# Patient Record
Sex: FEMALE | Race: WHITE | NOT HISPANIC OR LATINO | Employment: FULL TIME | URBAN - METROPOLITAN AREA
[De-identification: names, ages, dates, MRNs, and addresses within clinical notes are randomized per-mention and may not be internally consistent; named-entity substitution may affect disease eponyms.]

---

## 2017-01-24 ENCOUNTER — TELEPHONE (OUTPATIENT)
Dept: CARDIOLOGY | Facility: CLINIC | Age: 32
End: 2017-01-24

## 2017-01-24 NOTE — TELEPHONE ENCOUNTER
I would defer to the primary MD or psychiatrist. There is not any particular depression medication that is worse for heart disease that I know of.    K

## 2017-01-24 NOTE — TELEPHONE ENCOUNTER
----- Message from Gertrude Pizarro sent at 1/24/2017 10:05 AM EST -----  Regarding: Non-Urgent Medical Question  Contact: [phone]  I  am hoping you can recommend a anxiety/depression medication that is OK for the heart (fewest heart side effects)    Thank you,   Gertrude pizarro

## 2017-01-30 ENCOUNTER — TELEPHONE (OUTPATIENT)
Dept: CARDIOLOGY | Facility: CLINIC | Age: 32
End: 2017-01-30

## 2017-01-30 NOTE — TELEPHONE ENCOUNTER
----- Message from Gertrude Nayak sent at 1/29/2017 11:06 PM EST -----  Regarding: Non-Urgent Medical Question  Contact: [phone]  Today my heart rate has been 44 to 57.  Normally it runs 60 to 70.  As you know my bp has been running extremely low now today my hr was.  I'm not sure what's going on.   Do I need to have an echo ran to check on everything?  How low is to low?  As I was sitting and definitely not relaxed.  I did feel some sob and faint feeling.

## 2019-05-29 PROBLEM — K31.84 GASTROPARESIS: Status: ACTIVE | Noted: 2019-05-29

## 2019-05-29 PROBLEM — E16.2 HYPOGLYCEMIA: Status: ACTIVE | Noted: 2019-05-29

## 2019-05-29 PROBLEM — G90.A POTS (POSTURAL ORTHOSTATIC TACHYCARDIA SYNDROME): Status: ACTIVE | Noted: 2019-05-29

## 2019-05-29 PROBLEM — G62.9 NEUROPATHY: Status: ACTIVE | Noted: 2019-05-29

## 2019-05-29 PROBLEM — I73.00 RAYNAUD DISEASE: Status: ACTIVE | Noted: 2019-05-29

## 2019-05-29 PROBLEM — J45.909 ASTHMA: Status: ACTIVE | Noted: 2019-05-29

## 2019-06-03 ENCOUNTER — TELEPHONE (OUTPATIENT)
Dept: CARDIOLOGY | Facility: CLINIC | Age: 34
End: 2019-06-03

## 2019-06-03 NOTE — TELEPHONE ENCOUNTER
Pt has an appointment for august 1, 2019 @ 10:45, she was referred by dr mckeon @ the [facility], the patients wants to be worked in sooner if possible, [phone]

## 2019-06-27 ENCOUNTER — OFFICE VISIT (OUTPATIENT)
Dept: CARDIOLOGY | Facility: CLINIC | Age: 34
End: 2019-06-27

## 2019-06-27 VITALS
HEART RATE: 71 BPM | OXYGEN SATURATION: 98 % | SYSTOLIC BLOOD PRESSURE: 110 MMHG | WEIGHT: 173 LBS | BODY MASS INDEX: 27.15 KG/M2 | HEIGHT: 67 IN | DIASTOLIC BLOOD PRESSURE: 74 MMHG | RESPIRATION RATE: 20 BRPM

## 2019-06-27 DIAGNOSIS — G90.A POTS (POSTURAL ORTHOSTATIC TACHYCARDIA SYNDROME): Primary | ICD-10-CM

## 2019-06-27 DIAGNOSIS — I49.3 PVC'S (PREMATURE VENTRICULAR CONTRACTIONS): ICD-10-CM

## 2019-06-27 DIAGNOSIS — Z45.09 ENCOUNTER FOR LOOP RECORDER CHECK: ICD-10-CM

## 2019-06-27 PROCEDURE — 93291 INTERROG DEV EVAL SCRMS IP: CPT | Performed by: INTERNAL MEDICINE

## 2019-06-27 PROCEDURE — 99204 OFFICE O/P NEW MOD 45 MIN: CPT | Performed by: INTERNAL MEDICINE

## 2019-06-27 RX ORDER — HYDROCODONE BITARTRATE AND ACETAMINOPHEN 5; 325 MG/1; MG/1
1 TABLET ORAL EVERY 6 HOURS PRN
COMMUNITY

## 2019-06-27 RX ORDER — CETIRIZINE HYDROCHLORIDE 10 MG/1
10 TABLET ORAL DAILY
COMMUNITY

## 2019-06-28 ENCOUNTER — TELEPHONE (OUTPATIENT)
Dept: CARDIOLOGY | Facility: CLINIC | Age: 34
End: 2019-06-28

## 2019-06-28 RX ORDER — MIDODRINE HYDROCHLORIDE 5 MG/1
5 TABLET ORAL 3 TIMES DAILY
Qty: 270 TABLET | Refills: 3 | Status: SHIPPED | OUTPATIENT
Start: 2019-06-28

## 2019-07-01 PROBLEM — Z45.09 ENCOUNTER FOR LOOP RECORDER CHECK: Status: ACTIVE | Noted: 2019-07-01

## 2019-07-01 PROBLEM — R00.2 PALPITATIONS: Status: ACTIVE | Noted: 2019-07-01

## 2019-07-03 ENCOUNTER — TELEPHONE (OUTPATIENT)
Dept: CARDIOLOGY | Facility: CLINIC | Age: 34
End: 2019-07-03

## 2019-07-05 ENCOUNTER — TELEPHONE (OUTPATIENT)
Dept: CARDIOLOGY | Facility: CLINIC | Age: 34
End: 2019-07-05

## 2019-07-05 NOTE — TELEPHONE ENCOUNTER
Patient has questions about her medication.  Questions about her Mindodrine and Metropolol dosages and how to use per patient ?      [phone]

## 2023-10-08 ENCOUNTER — HOSPITAL ENCOUNTER (OUTPATIENT)
Facility: HOSPITAL | Age: 38
Setting detail: OBSERVATION
LOS: 1 days | Discharge: HOME | End: 2023-10-11
Attending: FAMILY MEDICINE | Admitting: STUDENT IN AN ORGANIZED HEALTH CARE EDUCATION/TRAINING PROGRAM
Payer: MEDICARE

## 2023-10-08 ENCOUNTER — APPOINTMENT (OUTPATIENT)
Dept: NUCLEAR MEDICINE | Facility: HOSPITAL | Age: 38
End: 2023-10-08
Payer: MEDICARE

## 2023-10-08 ENCOUNTER — APPOINTMENT (OUTPATIENT)
Dept: GENERAL RADIOLOGY | Facility: HOSPITAL | Age: 38
End: 2023-10-08
Payer: MEDICARE

## 2023-10-08 PROBLEM — U07.1 COVID-19: Status: ACTIVE | Noted: 2023-10-08

## 2023-10-08 LAB
ALBUMIN SERPL-MCNC: 4 G/DL (ref 3.5–5.2)
ALBUMIN/GLOB SERPL: 1.4 G/DL
ALP SERPL-CCNC: 64 U/L (ref 39–117)
ALT SERPL W P-5'-P-CCNC: 15 U/L (ref 1–33)
ANION GAP SERPL CALCULATED.3IONS-SCNC: 11.8 MMOL/L (ref 5–15)
APTT PPP: 30.9 SECONDS (ref 24.2–34.2)
AST SERPL-CCNC: 19 U/L (ref 1–32)
B-HCG UR QL: NEGATIVE
BASOPHILS # BLD AUTO: 0.02 10*3/MM3 (ref 0–0.2)
BASOPHILS NFR BLD AUTO: 0.5 % (ref 0–1.5)
BILIRUB SERPL-MCNC: 0.2 MG/DL (ref 0–1.2)
BUN SERPL-MCNC: 8 MG/DL (ref 6–20)
BUN/CREAT SERPL: 13.3 (ref 7–25)
CALCIUM SPEC-SCNC: 8.8 MG/DL (ref 8.6–10.5)
CHLORIDE SERPL-SCNC: 106 MMOL/L (ref 98–107)
CO2 SERPL-SCNC: 24.2 MMOL/L (ref 22–29)
CREAT SERPL-MCNC: 0.6 MG/DL (ref 0.57–1)
D DIMER PPP FEU-MCNC: 0.61 MCGFEU/ML (ref 0–0.5)
DEPRECATED RDW RBC AUTO: 46.3 FL (ref 37–54)
EGFRCR SERPLBLD CKD-EPI 2021: 117.3 ML/MIN/1.73
EOSINOPHIL # BLD AUTO: 0.01 10*3/MM3 (ref 0–0.4)
EOSINOPHIL NFR BLD AUTO: 0.2 % (ref 0.3–6.2)
ERYTHROCYTE [DISTWIDTH] IN BLOOD BY AUTOMATED COUNT: 13.2 % (ref 12.3–15.4)
FERRITIN SERPL-MCNC: 45.41 NG/ML (ref 13–150)
GEN 5 2HR TROPONIN T REFLEX: 10 NG/L
GLOBULIN UR ELPH-MCNC: 2.9 GM/DL
GLUCOSE SERPL-MCNC: 88 MG/DL (ref 65–99)
HCT VFR BLD AUTO: 40.7 % (ref 34–46.6)
HGB BLD-MCNC: 13.7 G/DL (ref 12–15.9)
IMM GRANULOCYTES # BLD AUTO: 0.01 10*3/MM3 (ref 0–0.05)
IMM GRANULOCYTES NFR BLD AUTO: 0.2 % (ref 0–0.5)
INR PPP: 1 (ref 0.86–1.15)
LDH SERPL-CCNC: 147 U/L (ref 135–214)
LYMPHOCYTES # BLD AUTO: 1.54 10*3/MM3 (ref 0.7–3.1)
LYMPHOCYTES NFR BLD AUTO: 36.3 % (ref 19.6–45.3)
MAGNESIUM SERPL-MCNC: 2.3 MG/DL (ref 1.6–2.6)
MCH RBC QN AUTO: 32 PG (ref 26.6–33)
MCHC RBC AUTO-ENTMCNC: 33.7 G/DL (ref 31.5–35.7)
MCV RBC AUTO: 95.1 FL (ref 79–97)
MONOCYTES # BLD AUTO: 1.01 10*3/MM3 (ref 0.1–0.9)
MONOCYTES NFR BLD AUTO: 23.8 % (ref 5–12)
NEUTROPHILS NFR BLD AUTO: 1.65 10*3/MM3 (ref 1.7–7)
NEUTROPHILS NFR BLD AUTO: 39 % (ref 42.7–76)
NRBC BLD AUTO-RTO: 0 /100 WBC (ref 0–0.2)
NT-PROBNP SERPL-MCNC: 97.6 PG/ML (ref 0–450)
PLATELET # BLD AUTO: 177 10*3/MM3 (ref 140–450)
PMV BLD AUTO: 10.7 FL (ref 6–12)
POTASSIUM SERPL-SCNC: 3.8 MMOL/L (ref 3.5–5.2)
PROCALCITONIN SERPL-MCNC: 0.05 NG/ML (ref 0–0.25)
PROT SERPL-MCNC: 6.9 G/DL (ref 6–8.5)
PROTHROMBIN TIME: 13.3 SECONDS (ref 11.8–14.9)
RBC # BLD AUTO: 4.28 10*6/MM3 (ref 3.77–5.28)
SODIUM SERPL-SCNC: 142 MMOL/L (ref 136–145)
TROPONIN T DELTA: -3 NG/L
TROPONIN T SERPL HS-MCNC: 13 NG/L
TSH SERPL DL<=0.05 MIU/L-ACNC: 1.1 UIU/ML (ref 0.27–4.2)
WBC NRBC COR # BLD: 4.24 10*3/MM3 (ref 3.4–10.8)

## 2023-10-08 PROCEDURE — 83615 LACTATE (LD) (LDH) ENZYME: CPT | Performed by: STUDENT IN AN ORGANIZED HEALTH CARE EDUCATION/TRAINING PROGRAM

## 2023-10-08 PROCEDURE — 78580 LUNG PERFUSION IMAGING: CPT

## 2023-10-08 PROCEDURE — G0378 HOSPITAL OBSERVATION PER HR: HCPCS

## 2023-10-08 PROCEDURE — 25810000003 LACTATED RINGERS SOLUTION: Performed by: STUDENT IN AN ORGANIZED HEALTH CARE EDUCATION/TRAINING PROGRAM

## 2023-10-08 PROCEDURE — 96372 THER/PROPH/DIAG INJ SC/IM: CPT

## 2023-10-08 PROCEDURE — 81025 URINE PREGNANCY TEST: CPT | Performed by: STUDENT IN AN ORGANIZED HEALTH CARE EDUCATION/TRAINING PROGRAM

## 2023-10-08 PROCEDURE — 83880 ASSAY OF NATRIURETIC PEPTIDE: CPT | Performed by: STUDENT IN AN ORGANIZED HEALTH CARE EDUCATION/TRAINING PROGRAM

## 2023-10-08 PROCEDURE — 0 TECHNETIUM ALBUMIN AGGREGATED: Performed by: STUDENT IN AN ORGANIZED HEALTH CARE EDUCATION/TRAINING PROGRAM

## 2023-10-08 PROCEDURE — A9540 TC99M MAA: HCPCS | Performed by: STUDENT IN AN ORGANIZED HEALTH CARE EDUCATION/TRAINING PROGRAM

## 2023-10-08 PROCEDURE — 85025 COMPLETE CBC W/AUTO DIFF WBC: CPT | Performed by: PHYSICIAN ASSISTANT

## 2023-10-08 PROCEDURE — 85730 THROMBOPLASTIN TIME PARTIAL: CPT | Performed by: PHYSICIAN ASSISTANT

## 2023-10-08 PROCEDURE — 84145 PROCALCITONIN (PCT): CPT | Performed by: STUDENT IN AN ORGANIZED HEALTH CARE EDUCATION/TRAINING PROGRAM

## 2023-10-08 PROCEDURE — 93005 ELECTROCARDIOGRAM TRACING: CPT | Performed by: STUDENT IN AN ORGANIZED HEALTH CARE EDUCATION/TRAINING PROGRAM

## 2023-10-08 PROCEDURE — 83735 ASSAY OF MAGNESIUM: CPT | Performed by: PHYSICIAN ASSISTANT

## 2023-10-08 PROCEDURE — 84484 ASSAY OF TROPONIN QUANT: CPT | Performed by: STUDENT IN AN ORGANIZED HEALTH CARE EDUCATION/TRAINING PROGRAM

## 2023-10-08 PROCEDURE — 99222 1ST HOSP IP/OBS MODERATE 55: CPT | Performed by: INTERNAL MEDICINE

## 2023-10-08 PROCEDURE — 80053 COMPREHEN METABOLIC PANEL: CPT | Performed by: PHYSICIAN ASSISTANT

## 2023-10-08 PROCEDURE — 25010000002 ENOXAPARIN PER 10 MG: Performed by: STUDENT IN AN ORGANIZED HEALTH CARE EDUCATION/TRAINING PROGRAM

## 2023-10-08 PROCEDURE — 84443 ASSAY THYROID STIM HORMONE: CPT | Performed by: STUDENT IN AN ORGANIZED HEALTH CARE EDUCATION/TRAINING PROGRAM

## 2023-10-08 PROCEDURE — 85379 FIBRIN DEGRADATION QUANT: CPT | Performed by: PHYSICIAN ASSISTANT

## 2023-10-08 PROCEDURE — 82728 ASSAY OF FERRITIN: CPT | Performed by: STUDENT IN AN ORGANIZED HEALTH CARE EDUCATION/TRAINING PROGRAM

## 2023-10-08 PROCEDURE — 71046 X-RAY EXAM CHEST 2 VIEWS: CPT

## 2023-10-08 PROCEDURE — 93010 ELECTROCARDIOGRAM REPORT: CPT | Performed by: INTERNAL MEDICINE

## 2023-10-08 PROCEDURE — 85610 PROTHROMBIN TIME: CPT | Performed by: PHYSICIAN ASSISTANT

## 2023-10-08 RX ORDER — POLYETHYLENE GLYCOL 3350 17 G/17G
17 POWDER, FOR SOLUTION ORAL DAILY PRN
Status: DISCONTINUED | OUTPATIENT
Start: 2023-10-08 | End: 2023-10-11 | Stop reason: HOSPADM

## 2023-10-08 RX ORDER — BISACODYL 5 MG/1
5 TABLET, DELAYED RELEASE ORAL DAILY PRN
Status: DISCONTINUED | OUTPATIENT
Start: 2023-10-08 | End: 2023-10-11 | Stop reason: HOSPADM

## 2023-10-08 RX ORDER — BISACODYL 10 MG
10 SUPPOSITORY, RECTAL RECTAL DAILY PRN
Status: DISCONTINUED | OUTPATIENT
Start: 2023-10-08 | End: 2023-10-11 | Stop reason: HOSPADM

## 2023-10-08 RX ORDER — SODIUM CHLORIDE 9 MG/ML
40 INJECTION, SOLUTION INTRAVENOUS AS NEEDED
Status: DISCONTINUED | OUTPATIENT
Start: 2023-10-08 | End: 2023-10-11 | Stop reason: HOSPADM

## 2023-10-08 RX ORDER — POTASSIUM CHLORIDE 1.5 G/1.58G
20 POWDER, FOR SOLUTION ORAL ONCE
Status: COMPLETED | OUTPATIENT
Start: 2023-10-08 | End: 2023-10-08

## 2023-10-08 RX ORDER — NICOTINE 21 MG/24HR
1 PATCH, TRANSDERMAL 24 HOURS TRANSDERMAL EVERY 24 HOURS
Status: DISCONTINUED | OUTPATIENT
Start: 2023-10-08 | End: 2023-10-11 | Stop reason: HOSPADM

## 2023-10-08 RX ORDER — ACETAMINOPHEN 160 MG/5ML
650 SOLUTION ORAL EVERY 4 HOURS PRN
Status: DISCONTINUED | OUTPATIENT
Start: 2023-10-08 | End: 2023-10-11 | Stop reason: HOSPADM

## 2023-10-08 RX ORDER — ENOXAPARIN SODIUM 100 MG/ML
1 INJECTION SUBCUTANEOUS ONCE
Status: CANCELLED | OUTPATIENT
Start: 2023-10-08

## 2023-10-08 RX ORDER — CETIRIZINE HYDROCHLORIDE 10 MG/1
10 TABLET, CHEWABLE ORAL DAILY
COMMUNITY

## 2023-10-08 RX ORDER — ACETAMINOPHEN 650 MG/1
650 SUPPOSITORY RECTAL EVERY 4 HOURS PRN
Status: DISCONTINUED | OUTPATIENT
Start: 2023-10-08 | End: 2023-10-11 | Stop reason: HOSPADM

## 2023-10-08 RX ORDER — ACETAMINOPHEN 325 MG/1
650 TABLET ORAL EVERY 4 HOURS PRN
Status: DISCONTINUED | OUTPATIENT
Start: 2023-10-08 | End: 2023-10-11 | Stop reason: HOSPADM

## 2023-10-08 RX ORDER — SODIUM CHLORIDE 0.9 % (FLUSH) 0.9 %
10 SYRINGE (ML) INJECTION EVERY 12 HOURS SCHEDULED
Status: DISCONTINUED | OUTPATIENT
Start: 2023-10-08 | End: 2023-10-11 | Stop reason: HOSPADM

## 2023-10-08 RX ORDER — NITROGLYCERIN 0.4 MG/1
0.4 TABLET SUBLINGUAL
Status: DISCONTINUED | OUTPATIENT
Start: 2023-10-08 | End: 2023-10-11 | Stop reason: HOSPADM

## 2023-10-08 RX ORDER — SODIUM CHLORIDE 0.9 % (FLUSH) 0.9 %
10 SYRINGE (ML) INJECTION AS NEEDED
Status: DISCONTINUED | OUTPATIENT
Start: 2023-10-08 | End: 2023-10-11 | Stop reason: HOSPADM

## 2023-10-08 RX ORDER — ENOXAPARIN SODIUM 100 MG/ML
40 INJECTION SUBCUTANEOUS EVERY 24 HOURS
Status: DISCONTINUED | OUTPATIENT
Start: 2023-10-08 | End: 2023-10-11 | Stop reason: HOSPADM

## 2023-10-08 RX ORDER — AMOXICILLIN 250 MG
2 CAPSULE ORAL 2 TIMES DAILY
Status: DISCONTINUED | OUTPATIENT
Start: 2023-10-08 | End: 2023-10-11 | Stop reason: HOSPADM

## 2023-10-08 RX ADMIN — DOCUSATE SODIUM 50MG AND SENNOSIDES 8.6MG 2 TABLET: 8.6; 5 TABLET, FILM COATED ORAL at 13:51

## 2023-10-08 RX ADMIN — Medication 10 ML: at 13:53

## 2023-10-08 RX ADMIN — DOCUSATE SODIUM 50MG AND SENNOSIDES 8.6MG 2 TABLET: 8.6; 5 TABLET, FILM COATED ORAL at 20:14

## 2023-10-08 RX ADMIN — SODIUM CHLORIDE, POTASSIUM CHLORIDE, SODIUM LACTATE AND CALCIUM CHLORIDE 500 ML: 600; 310; 30; 20 INJECTION, SOLUTION INTRAVENOUS at 13:48

## 2023-10-08 RX ADMIN — Medication 10 ML: at 20:15

## 2023-10-08 RX ADMIN — ENOXAPARIN SODIUM 40 MG: 100 INJECTION SUBCUTANEOUS at 13:50

## 2023-10-08 RX ADMIN — KIT FOR THE PREPARATION OF TECHNETIUM TC 99M ALBUMIN AGGREGATED 1 DOSE: 2.5 INJECTION, POWDER, FOR SOLUTION INTRAVENOUS at 16:10

## 2023-10-08 RX ADMIN — NIRMATRELVIR AND RITONAVIR 3 TABLET: KIT at 20:21

## 2023-10-08 RX ADMIN — POTASSIUM CHLORIDE 20 MEQ: 1.5 POWDER, FOR SOLUTION ORAL at 17:56

## 2023-10-08 RX ADMIN — METOPROLOL TARTRATE 12.5 MG: 25 TABLET, FILM COATED ORAL at 13:52

## 2023-10-08 NOTE — CONSULTS
[facility]   Cardiology Consult Note    Patient Name: Gerrtude Nayak  : 1985  MRN: [id number]  Primary Care Physician:  Pilar Rasmussen MD  Referring Physician: No ref. provider found  Date of admission: 10/8/2023    Subjective   Subjective     Reason for Consult/ Chief Complaint: Shortness of breath, heart racing    HPI:  Gertrude Nayak is a 37 y.o. female with past medical history significant for pots disease and status post PVC ablation in the past.  She has had a left heart cath back in the past that were normal.  She has been tried on multiple different combination of medications for the POTS.  She is currently not on any medications other than the Zyrtec.  She states she awoke very short of breath with her heart racing.  She states this is different than her POTS and she is never really felt this before.  She did go to the emergency department at an outside hospital and had adenosine given before they tried vagal maneuvers.  This did stop her SVT.  Again she states she has not had any symptoms like this before especially just lying down.  She was found to be COVID-positive and had an elevated D-dimer.  She apparently is unable to have contrast done so they transferred her up here for possible VQ scan.    Review of Systems   All systems were reviewed and negative except for: Shortness of breath, palpitations    Personal History     Past Medical History:   Diagnosis Date    Anxiety     Atrial tachycardia     Chiari malformation type I     Dyspnea     Gastroparesis     Heart chamber dysfunction     Hypoglycemia     Implantable loop recorder present     Low back pain     Mitral valve regurgitation     Neuropathy     Postural orthostatic tachycardia syndrome (POTS)     PVC (premature ventricular contraction)     Raynaud's phenomenon     Rheumatoid arthritis     Sleep apnea     Smoker     Systemic lupus erythematosus         Past medical history reviewed      Family History:  family history includes Cancer in her father and maternal grandfather; Diabetes in her maternal grandmother; Heart attack in her maternal grandmother; Hypertension in her maternal grandfather, maternal grandmother, and mother. Otherwise pertinent FHx was reviewed and not pertinent to current issue.    Social History:  reports that she has quit smoking. She has never used smokeless tobacco. She reports that she does not drink alcohol and does not use drugs.    Home Medications:  cetirizine    Allergies:  Allergies   Allergen Reactions    Nuts Anaphylaxis    Shellfish-Derived Products Anaphylaxis    Venlafaxine Arrhythmia, Confusion, Irritability, Mental Status Change and Shortness Of Breath    Acrylic Polymer [Carbomer]     Adhesive Tape Hives    Bupropion Hallucinations    Contrast Dye (Echo Or Unknown Ct/Mr) Unknown - Low Severity    Corticosteroids Arrhythmia    Gadobutrol Unknown - High Severity    Iodinated Contrast Media Other (See Comments)     Other reaction(s): hives    Metoclopramide Unknown (See Comments)    Shrimp Unknown (See Comments)    Stadol [Butorphanol] Unknown - High Severity    Vancomycin Unknown (See Comments)       Objective    Objective     Vitals:   Temp:  [98.2 øF (36.8 øC)] 98.2 øF (36.8 øC)  Heart Rate:  [82] 82  Resp:  [20] 20  BP: (103)/(65) 103/65      Physical Exam:   Constitutional: Awake, alert, No acute distress    Eyes: PERRLA, sclerae anicteric, no conjunctival injection   HENT: NCAT, mucous membranes moist   Neck: Supple, no thyromegaly, no lymphadenopathy, trachea midline   Respiratory: Clear to auscultation bilaterally, nonlabored respirations    Cardiovascular: RRR, no murmurs, rubs, or gallops, palpable pedal pulses bilaterally   Gastrointestinal: Positive bowel sounds, soft, nontender, nondistended   Musculoskeletal: No bilateral ankle edema, no clubbing or cyanosis to extremities   Psychiatric: Appropriate affect, cooperative   Neurologic: Oriented x 3, strength symmetric  in all extremities, Cranial Nerves grossly intact to confrontation, speech clear   Skin: No rashes     Result Review    Result Review:  I have personally reviewed the results from the time of this admission to 10/8/2023 14:11 EDT and agree with these findings:  [x]  Laboratory  []  Microbiology  [x]  Radiology  [x]  EKG/Telemetry   [x]  Cardiology/Vascular   []  Pathology  [x]  Old records  []  Other:  Most notable findings include:     CMP          10/8/2023    11:35   CMP   Glucose 88    BUN 8    Creatinine 0.60    EGFR 117.3    Sodium 142    Potassium 3.8    Chloride 106    Calcium 8.8    Total Protein 6.9    Albumin 4.0    Globulin 2.9    Total Bilirubin 0.2    Alkaline Phosphatase 64    AST (SGOT) 19    ALT (SGPT) 15    Albumin/Globulin Ratio 1.4    BUN/Creatinine Ratio 13.3    Anion Gap 11.8       CBC          10/8/2023    11:35   CBC   WBC 4.24    RBC 4.28    Hemoglobin 13.7    Hematocrit 40.7    MCV 95.1    MCH 32.0    MCHC 33.7    RDW 13.2    Platelets 177       Lab Results   Component Value Date    TROPONINT 13 (H) 10/08/2023         Assessment & Plan   Assessment / Plan     Brief Patient Summary:  Gertrude Nayak is a 37 y.o. female who has a history of pots disease.  She has also had a PVC ablation in the past.  She has been tried on multiple combinations of medications for the POTS with really no help versus not tolerating the medications.  She has been seen by an autonomic dysfunction clinic.  She awoke with shortness of breath and racing heart rate which was much different than her POTS.  She was found to be in an SVT and was broken with adenosine before vagal maneuvers were tried.  She was found to be COVID-positive with an elevated D-dimer.  She is intolerant apparently of contrast even with premedication and she was sent up here for a VQ scan.    Active Hospital Problems:  Active Hospital Problems    Diagnosis     **COVID-19        Assessment:  1.  SVT  2.  Elevated D-dimer  3.   COVID-positive    Plan:   1.  This sounds like the first time she has ever had an SVT like this.  It could have been secondary to her body being revved up with the COVID infection.  I did talk to her about the vagal maneuver.  We will just watch to see if she has any further episodes of this.  If she did we would have to consider ablation.  She has not tolerated multiple medications in the past for the POTS syndrome.  We will see how she tolerates the low-dose metoprolol.  We may have to stop that.  2.  Patient is being covered with Lovenox with a possible VQ scan tomorrow.  3.  Echocardiogram is been ordered.  4.  From a cardiac standpoint we will just follow from a distance, call if any questions and if any repeat SVT is noted.    Electronically signed by Benjamin Baron MD, 10/08/23, 2:11 PM EDT.

## 2023-10-08 NOTE — SIGNIFICANT NOTE
Patient is a 37-year-old female with a past medical history of Budd-Chiari, mitral valve regurg, and POTS who presented to [facility] emergency department due to general malaise and feeling of unwell for several days as well as sudden onset of severe shortness of air and felt like her heart was racing.    When she arrived to the [facility] emergency department, her heart rate was greater than 200.  EKG revealed that she was in SVT.  She was given adenosine with improvement in her heart rate to sinus tachycardia in the low 100s.  Patient had taken a home COVID test that was positive, she was also positive at the [facility] emergency department.      Chemistry unremarkable, white count normal.  A D-dimer was obtained that was 945 which is greater than doubled their upper limit of normal.  Chest x-ray was clear.    Patient is allergic to IV contrast, so CT PE protocol cannot be obtained.  Patient also stated that she could not take steroids so she could not be premedicated.  She will require a VQ scan.    Dr. Baron with cardiology was contacted to consult on the patient for her SVT.  He agreed.    Because the above, the hospitalist team was contacted to admit the patient for further management of her SVT, COVID-19, and to obtain a VQ scan.    Patient's vitals are stable at time of transfer.  She is afebrile, heart rate in the 90s, respirations 25, and she was 97% on room air.    Electronically signed by ADELA Gillespie, 10/08/23, 6:04 AM EDT.

## 2023-10-08 NOTE — H&P
[facility]   HOSPITALIST HISTORY AND PHYSICAL  Date: 10/8/2023   Patient Name: Gertrude Nayak  : 1985  MRN: [id number]  Primary Care Physician:  Pilar Rasmussen MD  Date of admission: 10/8/2023    Subjective   Subjective     Chief Complaint:     HPI:    Gertrude Nayak is a 37 y.o. female with significant hx of POTS, PVC s/p ablation, gastroparesis, IJ thrombus presented to outside hospital ER with complaints of shortness of breath and tachycardia.  Patient reports she started feeling myalgias on Thursday, tested herself on Thursday morning but was negative.  Repeat tested herself on Friday and positive  for COVID.  She reports fever as high as 102 last last spike on Saturday 10/7/2023..  She has chronic shortness of breath and but has noticed it is gotten worse since her COVID diagnosis.   She cannot really walk far she gets short of breath and this has been attributed to her POTS. She endorses cough with production of sputum, feeling congested, lightheaded.  Around 1 AM on 10/8/2023 he started feeling palpitations along with shortness of breath that woke her up from sleep after which he presented to outside hospital.    At the outside ER she was noted to be in SVT at 200 bpm, received adenosine 6 mg with resolution of SVT and conversion to sinus tachycardia.  On her lab review she was noted to have elevated D-dimer but patient had contrast allergy . She was transferred to [facility] for VQ scan.    She reports she has daily episodes of syncope, has been ongoing for almost 5 years.  She syncopized 4-5 times a day.  Has been followed by POTS clinic at [place] as well as electrophysiology at [facility].  Has a loop recorder in place. Has been on fludrocortisone as well as midodrine but now they have been taken off.     She reports she was on metoprolol tartrate 6.25 mg p.o. twice daily because that is all she can tolerate, but has been off of it for the past 2 months now. She  has hx of R IJ DVT in the setting of catheter as she used to get IVF frequently but that was taken out once she developed clot, she completed therapy with apixaban.      On arrival to [facility] she is afebrile at 98.2, pulse 82 bpm respiratory rate 20, blood pressure 103/65, saturating 97% on room air.  Repeat labs, chest x-ray and VQ scan pending.  Labs at outside hospital showing WBC count of 9, hemoglobin of 14.8, platelet of 214, sodium of 137, potassium of 3.5, BUN of 9, creatinine 1.7, glucose of 116, T. bili of 0.38 normal AST ALT, ALP of 75, INR 1.09, lipase of 137 CK of 65 magnesium of 2.4, BUN troponin of less than 0.01 influenza AMB negative, COVID-19 positive , D-dimer of 945.      She just recently moved in with her boyfriend.  She is on disability.  She smokes about 2 packs a day.  Occasional alcohol use.    Personal History     Past Medical History:  Past Medical History:   Diagnosis Date    Anxiety     Dyspnea     Low back pain     PVC (premature ventricular contraction)     Smoker        Past Surgical History:  Past Surgical History:   Procedure Laterality Date    CARDIAC CATHETERIZATION  04/23/2014    right dominant system; normal coronary arteries    CARDIAC CATHETERIZATION      CARDIAC ELECTROPHYSIOLOGY PROCEDURE N/A 8/29/2016    Procedure: Ablation VT;  Surgeon: Jomar Monroy MD;  Location: [facility] INVASIVE LOCATION;  Service:        Family History:   Family History   Problem Relation Age of Onset    Hypertension Mother     Cancer Father     Heart attack Maternal Grandmother     Hypertension Maternal Grandmother     Diabetes Maternal Grandmother     Hypertension Maternal Grandfather     Cancer Maternal Grandfather        Social History:   Social History     Socioeconomic History    Marital status: Single   Tobacco Use    Smoking status: Former    Smokeless tobacco: Never   Substance and Sexual Activity    Alcohol use: No    Drug use: No    Sexual activity: Defer       Home  Medications:  cetirizine    Allergies:  Allergies   Allergen Reactions    Nuts Anaphylaxis    Shellfish-Derived Products Anaphylaxis    Venlafaxine Arrhythmia, Confusion, Irritability, Mental Status Change and Shortness Of Breath    Acrylic Polymer [Carbomer]     Adhesive Tape Hives    Bupropion Hallucinations    Contrast Dye (Echo Or Unknown Ct/Mr) Unknown - Low Severity    Corticosteroids Arrhythmia    Gadobutrol Unknown - High Severity    Iodinated Contrast Media Other (See Comments)     Other reaction(s): hives    Metoclopramide Unknown (See Comments)    Shrimp Unknown (See Comments)    Stadol [Butorphanol] Unknown - High Severity    Vancomycin Unknown (See Comments)       Review of Systems   All systems were reviewed and negative except for: Shortness of breath, tachycardia, chest tightness, cough    Objective   Objective     Vitals:   Temp:  [98.2 øF (36.8 øC)] 98.2 øF (36.8 øC)  Heart Rate:  [82] 82  Resp:  [20] 20  BP: (103)/(65) 103/65    Physical Exam    Constitutional: Awake, alert, no acute distress able to speak in full sentences, no accessory muscle use   Eyes: Pupils equal, sclerae anicteric, no conjunctival injection   HENT: NCAT, mucous membranes moist   Neck: Supple, no JVP   Respiratory: Clear to auscultation bilaterally, nonlabored respirations    Cardiovascular: RRR, no murmurs, rubs, or gallops, palpable pedal pulses bilaterally   Gastrointestinal: Positive bowel sounds, soft, nontender, nondistended   Musculoskeletal: No bilateral ankle edema, no clubbing or cyanosis to extremities   Psychiatric: Appropriate affect, cooperative   Neurologic: Oriented x 3, strength symmetric in all extremities, speech clear   Skin: No rashes     Result Review    Result Review:  I have personally reviewed the results from the time of this admission to 10/8/2023 13:11 EDT and agree with these findings:  [x]  Laboratory  []  Microbiology  [x]  Radiology  []  EKG/Telemetry   []  Cardiology/Vascular   []   Pathology  [x]  Old records  []  Other:      Assessment & Plan   Assessment / Plan     Assessment/Plan:   SVT  Chest tightness  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  Repeat EKG with resolution of SVT  EKG 10/8/23 shows normal sinus rhythm   Discussed with cardiology, recommended starting low-dose metoprolol, and obtaining echo.  Check TSH  Tele monitoring   Keep K>4, mg>2    COVID  Elevated D-dimer  COVID labs including LDH and ferritin  Repeat D-dimer   Check checks x-ray, VQ scan and procalcitonin.   Check lower extremity doppler scan.   We will treat with Paxlovid as her chest x-ray is clear and she is not requiring any supplemental oxygen.  Follow results.       POTS  History of PVCs s/p ablation  Gastroparesis.  Current smoker   - Nicotine patch   - Smoking cessation counseling.   - Follow up with autonomic center.      DVT prophylaxis:  Lovenox    CODE STATUS:    Level Of Support Discussed With: Patient  Code Status (Patient has no pulse and is not breathing): CPR (Attempt to Resuscitate)  Medical Interventions (Patient has pulse or is breathing): Full Support      Admission Status:  I believe this patient meets Observation status.    Electronically signed by Myriam Greco MD, 10/08/23, 11:40 AM EDT.

## 2023-10-09 ENCOUNTER — APPOINTMENT (OUTPATIENT)
Dept: GENERAL RADIOLOGY | Facility: HOSPITAL | Age: 38
End: 2023-10-09
Payer: MEDICARE

## 2023-10-09 ENCOUNTER — APPOINTMENT (OUTPATIENT)
Facility: HOSPITAL | Age: 38
End: 2023-10-09
Payer: MEDICARE

## 2023-10-09 ENCOUNTER — APPOINTMENT (OUTPATIENT)
Dept: CARDIOLOGY | Facility: HOSPITAL | Age: 38
End: 2023-10-09
Payer: MEDICARE

## 2023-10-09 LAB
ALBUMIN SERPL-MCNC: 4 G/DL (ref 3.5–5.2)
ALBUMIN/GLOB SERPL: 1.6 G/DL
ALP SERPL-CCNC: 58 U/L (ref 39–117)
ALT SERPL W P-5'-P-CCNC: 13 U/L (ref 1–33)
ANION GAP SERPL CALCULATED.3IONS-SCNC: 8.1 MMOL/L (ref 5–15)
AST SERPL-CCNC: 15 U/L (ref 1–32)
BASOPHILS # BLD AUTO: 0.02 10*3/MM3 (ref 0–0.2)
BASOPHILS NFR BLD AUTO: 0.4 % (ref 0–1.5)
BH CV ECHO MEAS - AO MAX PG: 6 MMHG
BH CV ECHO MEAS - AO MEAN PG: 3 MMHG
BH CV ECHO MEAS - AO ROOT DIAM: 3.1 CM
BH CV ECHO MEAS - AO V2 MAX: 125 CM/SEC
BH CV ECHO MEAS - AO V2 VTI: 22.7 CM
BH CV ECHO MEAS - AVA(I,D): 2.24 CM2
BH CV ECHO MEAS - EDV(CUBED): 54.9 ML
BH CV ECHO MEAS - EDV(MOD-SP2): 54.2 ML
BH CV ECHO MEAS - EDV(MOD-SP4): 63.1 ML
BH CV ECHO MEAS - EF(MOD-SP2): 62.2 %
BH CV ECHO MEAS - EF(MOD-SP4): 62.4 %
BH CV ECHO MEAS - ESV(CUBED): 17.6 ML
BH CV ECHO MEAS - ESV(MOD-SP2): 20.5 ML
BH CV ECHO MEAS - ESV(MOD-SP4): 23.7 ML
BH CV ECHO MEAS - FS: 31.6 %
BH CV ECHO MEAS - IVS/LVPW: 1.18 CM
BH CV ECHO MEAS - IVSD: 1.3 CM
BH CV ECHO MEAS - LA DIMENSION: 3.2 CM
BH CV ECHO MEAS - LAT PEAK E' VEL: 13.2 CM/SEC
BH CV ECHO MEAS - LV MASS(C)D: 153.2 GRAMS
BH CV ECHO MEAS - LV MAX PG: 3.6 MMHG
BH CV ECHO MEAS - LV MEAN PG: 2 MMHG
BH CV ECHO MEAS - LV V1 MAX: 95.3 CM/SEC
BH CV ECHO MEAS - LV V1 VTI: 16.2 CM
BH CV ECHO MEAS - LVIDD: 3.8 CM
BH CV ECHO MEAS - LVIDS: 2.6 CM
BH CV ECHO MEAS - LVOT AREA: 3.1 CM2
BH CV ECHO MEAS - LVOT DIAM: 2 CM
BH CV ECHO MEAS - LVPWD: 1.1 CM
BH CV ECHO MEAS - MED PEAK E' VEL: 7.3 CM/SEC
BH CV ECHO MEAS - MV A MAX VEL: 65.5 CM/SEC
BH CV ECHO MEAS - MV DEC SLOPE: 405 CM/SEC2
BH CV ECHO MEAS - MV DEC TIME: 0.23 SEC
BH CV ECHO MEAS - MV E MAX VEL: 69.2 CM/SEC
BH CV ECHO MEAS - MV E/A: 1.06
BH CV ECHO MEAS - MV MAX PG: 3 MMHG
BH CV ECHO MEAS - MV MEAN PG: 1 MMHG
BH CV ECHO MEAS - MV P1/2T: 59.8 MSEC
BH CV ECHO MEAS - MV V2 VTI: 19.1 CM
BH CV ECHO MEAS - MVA(P1/2T): 3.7 CM2
BH CV ECHO MEAS - MVA(VTI): 2.7 CM2
BH CV ECHO MEAS - RVDD: 2.9 CM
BH CV ECHO MEAS - SV(LVOT): 50.9 ML
BH CV ECHO MEAS - SV(MOD-SP2): 33.7 ML
BH CV ECHO MEAS - SV(MOD-SP4): 39.4 ML
BH CV ECHO MEASUREMENTS AVERAGE E/E' RATIO: 6.75
BILIRUB SERPL-MCNC: 0.2 MG/DL (ref 0–1.2)
BUN SERPL-MCNC: 9 MG/DL (ref 6–20)
BUN/CREAT SERPL: 14.1 (ref 7–25)
CALCIUM SPEC-SCNC: 9 MG/DL (ref 8.6–10.5)
CHLORIDE SERPL-SCNC: 105 MMOL/L (ref 98–107)
CO2 SERPL-SCNC: 24.9 MMOL/L (ref 22–29)
CREAT SERPL-MCNC: 0.64 MG/DL (ref 0.57–1)
DEPRECATED RDW RBC AUTO: 45.5 FL (ref 37–54)
EGFRCR SERPLBLD CKD-EPI 2021: 115.5 ML/MIN/1.73
EOSINOPHIL # BLD AUTO: 0.12 10*3/MM3 (ref 0–0.4)
EOSINOPHIL NFR BLD AUTO: 2.3 % (ref 0.3–6.2)
ERYTHROCYTE [DISTWIDTH] IN BLOOD BY AUTOMATED COUNT: 13 % (ref 12.3–15.4)
GLOBULIN UR ELPH-MCNC: 2.5 GM/DL
GLUCOSE SERPL-MCNC: 89 MG/DL (ref 65–99)
HCT VFR BLD AUTO: 40.5 % (ref 34–46.6)
HGB BLD-MCNC: 13.5 G/DL (ref 12–15.9)
IMM GRANULOCYTES # BLD AUTO: 0.01 10*3/MM3 (ref 0–0.05)
IMM GRANULOCYTES NFR BLD AUTO: 0.2 % (ref 0–0.5)
IVRT: 98 MS
LYMPHOCYTES # BLD AUTO: 2 10*3/MM3 (ref 0.7–3.1)
LYMPHOCYTES NFR BLD AUTO: 39 % (ref 19.6–45.3)
MAGNESIUM SERPL-MCNC: 2.1 MG/DL (ref 1.6–2.6)
MCH RBC QN AUTO: 31.8 PG (ref 26.6–33)
MCHC RBC AUTO-ENTMCNC: 33.3 G/DL (ref 31.5–35.7)
MCV RBC AUTO: 95.3 FL (ref 79–97)
MONOCYTES # BLD AUTO: 0.93 10*3/MM3 (ref 0.1–0.9)
MONOCYTES NFR BLD AUTO: 18.1 % (ref 5–12)
NEUTROPHILS NFR BLD AUTO: 2.05 10*3/MM3 (ref 1.7–7)
NEUTROPHILS NFR BLD AUTO: 40 % (ref 42.7–76)
NRBC BLD AUTO-RTO: 0 /100 WBC (ref 0–0.2)
PHOSPHATE SERPL-MCNC: 3.3 MG/DL (ref 2.5–4.5)
PLATELET # BLD AUTO: 172 10*3/MM3 (ref 140–450)
PMV BLD AUTO: 11.3 FL (ref 6–12)
POTASSIUM SERPL-SCNC: 3.8 MMOL/L (ref 3.5–5.2)
PROT SERPL-MCNC: 6.5 G/DL (ref 6–8.5)
RBC # BLD AUTO: 4.25 10*6/MM3 (ref 3.77–5.28)
SODIUM SERPL-SCNC: 138 MMOL/L (ref 136–145)
WBC NRBC COR # BLD: 5.13 10*3/MM3 (ref 3.4–10.8)

## 2023-10-09 PROCEDURE — 25810000003 SODIUM CHLORIDE 0.9 % SOLUTION: Performed by: STUDENT IN AN ORGANIZED HEALTH CARE EDUCATION/TRAINING PROGRAM

## 2023-10-09 PROCEDURE — 85025 COMPLETE CBC W/AUTO DIFF WBC: CPT | Performed by: STUDENT IN AN ORGANIZED HEALTH CARE EDUCATION/TRAINING PROGRAM

## 2023-10-09 PROCEDURE — 96372 THER/PROPH/DIAG INJ SC/IM: CPT

## 2023-10-09 PROCEDURE — 84100 ASSAY OF PHOSPHORUS: CPT | Performed by: STUDENT IN AN ORGANIZED HEALTH CARE EDUCATION/TRAINING PROGRAM

## 2023-10-09 PROCEDURE — 74018 RADEX ABDOMEN 1 VIEW: CPT

## 2023-10-09 PROCEDURE — 93970 EXTREMITY STUDY: CPT | Performed by: SURGERY

## 2023-10-09 PROCEDURE — 83735 ASSAY OF MAGNESIUM: CPT | Performed by: STUDENT IN AN ORGANIZED HEALTH CARE EDUCATION/TRAINING PROGRAM

## 2023-10-09 PROCEDURE — G0378 HOSPITAL OBSERVATION PER HR: HCPCS

## 2023-10-09 PROCEDURE — 93306 TTE W/DOPPLER COMPLETE: CPT | Performed by: STUDENT IN AN ORGANIZED HEALTH CARE EDUCATION/TRAINING PROGRAM

## 2023-10-09 PROCEDURE — 25010000002 ENOXAPARIN PER 10 MG: Performed by: STUDENT IN AN ORGANIZED HEALTH CARE EDUCATION/TRAINING PROGRAM

## 2023-10-09 PROCEDURE — 93306 TTE W/DOPPLER COMPLETE: CPT

## 2023-10-09 PROCEDURE — 93970 EXTREMITY STUDY: CPT

## 2023-10-09 PROCEDURE — 80053 COMPREHEN METABOLIC PANEL: CPT | Performed by: STUDENT IN AN ORGANIZED HEALTH CARE EDUCATION/TRAINING PROGRAM

## 2023-10-09 RX ORDER — SODIUM CHLORIDE 9 MG/ML
75 INJECTION, SOLUTION INTRAVENOUS CONTINUOUS
Status: ACTIVE | OUTPATIENT
Start: 2023-10-09 | End: 2023-10-10

## 2023-10-09 RX ORDER — DIPHENHYDRAMINE HYDROCHLORIDE, ZINC ACETATE 2; .1 G/100G; G/100G
1 CREAM TOPICAL 3 TIMES DAILY PRN
Status: DISCONTINUED | OUTPATIENT
Start: 2023-10-09 | End: 2023-10-11 | Stop reason: HOSPADM

## 2023-10-09 RX ADMIN — ENOXAPARIN SODIUM 40 MG: 100 INJECTION SUBCUTANEOUS at 13:11

## 2023-10-09 RX ADMIN — NIRMATRELVIR AND RITONAVIR 3 TABLET: KIT at 10:05

## 2023-10-09 RX ADMIN — SODIUM CHLORIDE 75 ML/HR: 9 INJECTION, SOLUTION INTRAVENOUS at 16:39

## 2023-10-09 RX ADMIN — Medication 10 ML: at 20:12

## 2023-10-09 RX ADMIN — NIRMATRELVIR AND RITONAVIR 3 TABLET: KIT at 20:14

## 2023-10-09 RX ADMIN — DOCUSATE SODIUM 50MG AND SENNOSIDES 8.6MG 2 TABLET: 8.6; 5 TABLET, FILM COATED ORAL at 10:04

## 2023-10-09 RX ADMIN — DOCUSATE SODIUM 50MG AND SENNOSIDES 8.6MG 2 TABLET: 8.6; 5 TABLET, FILM COATED ORAL at 20:13

## 2023-10-09 RX ADMIN — Medication 10 ML: at 10:05

## 2023-10-09 NOTE — PROGRESS NOTES
[facility]   Hospitalist Progress Note  Date: 10/9/2023  Patient Name: Gertrude Nayak  : 1985  MRN: [id number]  Date of admission: 10/8/2023  Room/Bed:       Subjective   Subjective     Chief Complaint: Shortness of breath, palpitation    Summary:Gertrude Nayak is a 37 y.o. female With past medical history of POTS, PVC s/p ablation, gastroparesis and ICA thrombus who initially presented to outside ER with complaints of shortness of breath and palpitation.  Patient stated that she was feeling myalgia since last Thursday, tested negative in home test for COVID.  But her symptoms persisted and she retested on Friday and was positive for COVID then.  She also developed high fever with documented Tmax of 102 last Saturday.  Patient has chronic shortness of breath but stated that her dyspnea worsened since COVID was diagnosed.  She was very short of breath and was unable to walk which she attributed to her underlying POTS.  Patient also complained of cough with phlegm production and lightheadedness.  On the day of presentation, patient was having palpitation along with shortness of breath which woke her up from sleep after which she decided to go to ER of outside hospital.    Patient smokes 2 packs a day and recently moved in with her .  She occasionally drinks alcohol.    On presentation to outside ER, patient was found to be in SVT with HR in 200s.  She received adenosine 6 mg with resolution of SVT and conversion to sinus rhythm.  She was noted to have elevated D-dimer and given her allergy to contrast, she was transferred to [facility] for VQ scan.    On arrival to [facility], patient heart rate was 82 bpm and she was saturating well in room air.  Patient was admitted to hospitalist service for further evaluation and management.    Interval Followup: No acute events overnight.  Patient stated that she is still lightheaded but her shortness of breath is  improved.  She denied any fever, chills, rigors, chest pain, headache, blurry vision or abdominal pain.  She was lying comfortably in a recliner and not in acute distress.    Review of Systems    All systems reviewed and negative except for what is outlined above.      Objective   Objective     Vitals:   Temp:  [98.1 øF (36.7 øC)-98.5 øF (36.9 øC)] 98.2 øF (36.8 øC)  Heart Rate:  [69-82] 79  Resp:  [16-20] 16  BP: ()/(58-71) 97/58    Physical Exam   General: Awake, alert, NAD  Cardiovascular: RRR, no murmurs   Pulmonary: CTA bilaterally; no wheezes; no conversational dyspnea  Gastrointestinal: S/ND/NT, +BS  Neuro: Alert, awake, oriented x3; speech clear; no tremor  Psych: Mood and affect appropriate      Result Review    Result Review:  I have personally reviewed these results:  [x]  Laboratory      Lab 10/09/23  0407 10/08/23  1135   WBC 5.13 4.24   HEMOGLOBIN 13.5 13.7   HEMATOCRIT 40.5 40.7   PLATELETS 172 177   NEUTROS ABS 2.05 1.65*   IMMATURE GRANS (ABS) 0.01 0.01   LYMPHS ABS 2.00 1.54   MONOS ABS 0.93* 1.01*   EOS ABS 0.12 0.01   MCV 95.3 95.1   PROCALCITONIN  --  0.05   LDH  --  147   PROTIME  --  13.3   APTT  --  30.9   D DIMER QUANT  --  0.61*         Lab 10/09/23  0407 10/08/23  1135   SODIUM 138 142   POTASSIUM 3.8 3.8   CHLORIDE 105 106   CO2 24.9 24.2   ANION GAP 8.1 11.8   BUN 9 8   CREATININE 0.64 0.60   EGFR 115.5 117.3   GLUCOSE 89 88   CALCIUM 9.0 8.8   MAGNESIUM 2.1 2.3   PHOSPHORUS 3.3  --    TSH  --  1.100         Lab 10/09/23  0407 10/08/23  1135   TOTAL PROTEIN 6.5 6.9   ALBUMIN 4.0 4.0   GLOBULIN 2.5 2.9   ALT (SGPT) 13 15   AST (SGOT) 15 19   BILIRUBIN 0.2 0.2   ALK PHOS 58 64         Lab 10/08/23  1349 10/08/23  1135   PROBNP  --  97.6   HSTROP T 10* 13*   PROTIME  --  13.3   INR  --  1.00             Lab 10/08/23  1135   FERRITIN 45.41         Brief Urine Lab Results  (Last result in the past 365 days)        Color   Clarity   Blood   Leuk Est   Nitrite   Protein   CREAT   Urine  HCG        10/08/23 1541               Negative             [x]  Microbiology   Microbiology Results (last 10 days)       Procedure Component Value - Date/Time    COVID-19, ABBOTT IN-HOUSE,NASAL Swab (NO TRANSPORT MEDIA) 2 HR TAT - , [[id number]]  (Abnormal) Collected: 10/08/23 0309    Lab Status: Final result Updated: 10/08/23 1018    Influenza Antigen, Rapid - , [[id number]] Collected: 10/08/23 0309    Lab Status: Final result Updated: 10/08/23 1018          [x]  Radiology  NM Lung Scan Perfusion Particulate    Result Date: 10/8/2023    1. Normal perfusion scan     Fredi Bellamy M.D.       Electronically Signed and Approved By: Fredi Bellamy M.D. on 10/08/2023 at 17:44             XR Chest PA & Lateral    Result Date: 10/8/2023    1. No acute cardiopulmonary disease.     Fredi Bellamy M.D.       Electronically Signed and Approved By: Fredi Bellamy M.D. on 10/08/2023 at 12:09             XR Chest 1 View    Result Date: 10/8/2023  Chest with no acute disease. Electronically Signed: Andres Blair MD 2023/10/08 at 5:35 CDT Reading Location ID and State: [id number] / [place] Tel [phone], Service support  [phone], Fax [phone]   []  EKG/Telemetry   []  Cardiology/Vascular   []  Pathology  []  Old records  []  Other:    Assessment & Plan   Assessment / Plan     Assessment:  Supraventricular tachycardia  COVID infection  History of POTS  History of PVCs s/p ablation  Gastroparesis  Active smoker    Plan:  Patient is currently being managed in hospitalist service.  Patient was found to have SVT s/p adenosine after which she was in sinus rhythm.  Cardiology following the patient, SVT likely secondary to recent COVID infection.  Plan to start on low-dose metoprolol, will stop if she cannot tolerate it given history of intolerance to multiple medication in the past for POTS syndrome.  VQ scan showed normal perfusion scan.  Chest x-ray showed no acute cardiopulmonary disease.  Patient currently saturating well on room  air.  Transthoracic echo on 10/09 showed EF of 61-65% with normal left ventricular diastolic function and no obvious wall motion abnormalities.  No further episode of SVT.  Symptomatic management for COVID.  Also started on Paxlovid.  Started on maintenance IV fluid given patient is not having good oral intake and also for lightheadedness likely due to hypotension which could be secondary to poor oral intake in the setting of COVID infection.  Continue to monitor.  Continue rest of the current management.     Discussed with RN.    DVT prophylaxis:  Medical DVT prophylaxis orders are present.    CODE STATUS:   Level Of Support Discussed With: Patient  Code Status (Patient has no pulse and is not breathing): CPR (Attempt to Resuscitate)  Medical Interventions (Patient has pulse or is breathing): Full Support      Electronically signed by Jesus Moreno MD, 10/09/23, 9:59 AM EDT.

## 2023-10-09 NOTE — PLAN OF CARE
Goal Outcome Evaluation:         Patient on room air w/ RR even and unlabored, alert and oriented x 4, call light in reach, bed in lowest setting. No s/s of pain noted/reported. Patient maintained NSR through night shift. No acute events over night.

## 2023-10-09 NOTE — PLAN OF CARE
Goal Outcome Evaluation:      VSS. No acute events during shift. Patient resting comfortably at end of shift.

## 2023-10-09 NOTE — SIGNIFICANT NOTE
10/09/23 1100   Coping/Psychosocial   Observed Emotional State calm;cooperative   Verbalized Emotional State hopefulness   Trust Relationship/Rapport empathic listening provided   Involvement in Care interacting with patient   Additional Documentation Spiritual Care (Group)   Spiritual Care   Use of Spiritual Resources prayer;spirituality for coping, indicated strong use of   Spiritual Care Source patient request (describe)   Spiritual Care Follow-Up follow-up, none required as presently assessed   Response to Spiritual Care receptive of support;engaged in conversation;thanks expressed   Spiritual Care Interventions supportive conversation provided;prayer support provided   Spiritual Care Visit Type other (see comments)  (Consult)   Spiritual Care Request prayer support   Receptivity to Spiritual Care visit welcomed

## 2023-10-10 LAB
ALBUMIN SERPL-MCNC: 3.4 G/DL (ref 3.5–5.2)
ALBUMIN/GLOB SERPL: 1.2 G/DL
ALP SERPL-CCNC: 56 U/L (ref 39–117)
ALT SERPL W P-5'-P-CCNC: 10 U/L (ref 1–33)
ANION GAP SERPL CALCULATED.3IONS-SCNC: 9.1 MMOL/L (ref 5–15)
AST SERPL-CCNC: 11 U/L (ref 1–32)
BASOPHILS # BLD AUTO: 0.02 10*3/MM3 (ref 0–0.2)
BASOPHILS NFR BLD AUTO: 0.4 % (ref 0–1.5)
BH CV LOWER VASCULAR LEFT COMMON FEMORAL AUGMENT: NORMAL
BH CV LOWER VASCULAR LEFT COMMON FEMORAL COMPETENT: NORMAL
BH CV LOWER VASCULAR LEFT COMMON FEMORAL COMPRESS: NORMAL
BH CV LOWER VASCULAR LEFT COMMON FEMORAL PHASIC: NORMAL
BH CV LOWER VASCULAR LEFT COMMON FEMORAL SPONT: NORMAL
BH CV LOWER VASCULAR LEFT DISTAL FEMORAL COMPRESS: NORMAL
BH CV LOWER VASCULAR LEFT GASTRONEMIUS COMPRESS: NORMAL
BH CV LOWER VASCULAR LEFT GREATER SAPH AK COMPRESS: NORMAL
BH CV LOWER VASCULAR LEFT GREATER SAPH BK COMPRESS: NORMAL
BH CV LOWER VASCULAR LEFT LESSER SAPH COMPRESS: NORMAL
BH CV LOWER VASCULAR LEFT MID FEMORAL AUGMENT: NORMAL
BH CV LOWER VASCULAR LEFT MID FEMORAL COMPETENT: NORMAL
BH CV LOWER VASCULAR LEFT MID FEMORAL COMPRESS: NORMAL
BH CV LOWER VASCULAR LEFT MID FEMORAL PHASIC: NORMAL
BH CV LOWER VASCULAR LEFT MID FEMORAL SPONT: NORMAL
BH CV LOWER VASCULAR LEFT PERONEAL COMPRESS: NORMAL
BH CV LOWER VASCULAR LEFT POPLITEAL AUGMENT: NORMAL
BH CV LOWER VASCULAR LEFT POPLITEAL COMPETENT: NORMAL
BH CV LOWER VASCULAR LEFT POPLITEAL COMPRESS: NORMAL
BH CV LOWER VASCULAR LEFT POPLITEAL PHASIC: NORMAL
BH CV LOWER VASCULAR LEFT POPLITEAL SPONT: NORMAL
BH CV LOWER VASCULAR LEFT POSTERIOR TIBIAL COMPRESS: NORMAL
BH CV LOWER VASCULAR LEFT PROXIMAL FEMORAL COMPRESS: NORMAL
BH CV LOWER VASCULAR LEFT SAPHENOFEMORAL JUNCTION COMPRESS: NORMAL
BH CV LOWER VASCULAR RIGHT COMMON FEMORAL AUGMENT: NORMAL
BH CV LOWER VASCULAR RIGHT COMMON FEMORAL COMPETENT: NORMAL
BH CV LOWER VASCULAR RIGHT COMMON FEMORAL COMPRESS: NORMAL
BH CV LOWER VASCULAR RIGHT COMMON FEMORAL PHASIC: NORMAL
BH CV LOWER VASCULAR RIGHT COMMON FEMORAL SPONT: NORMAL
BH CV LOWER VASCULAR RIGHT DISTAL FEMORAL COMPRESS: NORMAL
BH CV LOWER VASCULAR RIGHT GASTRONEMIUS COMPRESS: NORMAL
BH CV LOWER VASCULAR RIGHT GREATER SAPH AK COMPRESS: NORMAL
BH CV LOWER VASCULAR RIGHT GREATER SAPH BK COMPRESS: NORMAL
BH CV LOWER VASCULAR RIGHT LESSER SAPH COMPRESS: NORMAL
BH CV LOWER VASCULAR RIGHT MID FEMORAL AUGMENT: NORMAL
BH CV LOWER VASCULAR RIGHT MID FEMORAL COMPETENT: NORMAL
BH CV LOWER VASCULAR RIGHT MID FEMORAL COMPRESS: NORMAL
BH CV LOWER VASCULAR RIGHT MID FEMORAL PHASIC: NORMAL
BH CV LOWER VASCULAR RIGHT MID FEMORAL SPONT: NORMAL
BH CV LOWER VASCULAR RIGHT PERONEAL COMPRESS: NORMAL
BH CV LOWER VASCULAR RIGHT POPLITEAL AUGMENT: NORMAL
BH CV LOWER VASCULAR RIGHT POPLITEAL COMPETENT: NORMAL
BH CV LOWER VASCULAR RIGHT POPLITEAL COMPRESS: NORMAL
BH CV LOWER VASCULAR RIGHT POPLITEAL PHASIC: NORMAL
BH CV LOWER VASCULAR RIGHT POPLITEAL SPONT: NORMAL
BH CV LOWER VASCULAR RIGHT POSTERIOR TIBIAL COMPRESS: NORMAL
BH CV LOWER VASCULAR RIGHT PROXIMAL FEMORAL COMPRESS: NORMAL
BH CV LOWER VASCULAR RIGHT SAPHENOFEMORAL JUNCTION COMPRESS: NORMAL
BH CV VAS PRELIMINARY FINDINGS SCRIPTING: 1
BILIRUB SERPL-MCNC: 0.3 MG/DL (ref 0–1.2)
BUN SERPL-MCNC: 6 MG/DL (ref 6–20)
BUN/CREAT SERPL: 11.3 (ref 7–25)
CALCIUM SPEC-SCNC: 8.6 MG/DL (ref 8.6–10.5)
CHLORIDE SERPL-SCNC: 108 MMOL/L (ref 98–107)
CO2 SERPL-SCNC: 22.9 MMOL/L (ref 22–29)
CREAT SERPL-MCNC: 0.53 MG/DL (ref 0.57–1)
DEPRECATED RDW RBC AUTO: 44.8 FL (ref 37–54)
EGFRCR SERPLBLD CKD-EPI 2021: 120.8 ML/MIN/1.73
EOSINOPHIL # BLD AUTO: 0.34 10*3/MM3 (ref 0–0.4)
EOSINOPHIL NFR BLD AUTO: 6.3 % (ref 0.3–6.2)
ERYTHROCYTE [DISTWIDTH] IN BLOOD BY AUTOMATED COUNT: 12.7 % (ref 12.3–15.4)
GLOBULIN UR ELPH-MCNC: 2.8 GM/DL
GLUCOSE SERPL-MCNC: 94 MG/DL (ref 65–99)
HCT VFR BLD AUTO: 37.9 % (ref 34–46.6)
HGB BLD-MCNC: 12.6 G/DL (ref 12–15.9)
IMM GRANULOCYTES # BLD AUTO: 0.01 10*3/MM3 (ref 0–0.05)
IMM GRANULOCYTES NFR BLD AUTO: 0.2 % (ref 0–0.5)
LYMPHOCYTES # BLD AUTO: 2.03 10*3/MM3 (ref 0.7–3.1)
LYMPHOCYTES NFR BLD AUTO: 37.7 % (ref 19.6–45.3)
MAGNESIUM SERPL-MCNC: 2 MG/DL (ref 1.6–2.6)
MCH RBC QN AUTO: 31.7 PG (ref 26.6–33)
MCHC RBC AUTO-ENTMCNC: 33.2 G/DL (ref 31.5–35.7)
MCV RBC AUTO: 95.2 FL (ref 79–97)
MONOCYTES # BLD AUTO: 0.67 10*3/MM3 (ref 0.1–0.9)
MONOCYTES NFR BLD AUTO: 12.4 % (ref 5–12)
NEUTROPHILS NFR BLD AUTO: 2.32 10*3/MM3 (ref 1.7–7)
NEUTROPHILS NFR BLD AUTO: 43 % (ref 42.7–76)
NRBC BLD AUTO-RTO: 0 /100 WBC (ref 0–0.2)
PHOSPHATE SERPL-MCNC: 3.4 MG/DL (ref 2.5–4.5)
PLATELET # BLD AUTO: 164 10*3/MM3 (ref 140–450)
PMV BLD AUTO: 10.9 FL (ref 6–12)
POTASSIUM SERPL-SCNC: 3.7 MMOL/L (ref 3.5–5.2)
PROT SERPL-MCNC: 6.2 G/DL (ref 6–8.5)
RBC # BLD AUTO: 3.98 10*6/MM3 (ref 3.77–5.28)
SODIUM SERPL-SCNC: 140 MMOL/L (ref 136–145)
WBC NRBC COR # BLD: 5.39 10*3/MM3 (ref 3.4–10.8)

## 2023-10-10 PROCEDURE — 85025 COMPLETE CBC W/AUTO DIFF WBC: CPT | Performed by: STUDENT IN AN ORGANIZED HEALTH CARE EDUCATION/TRAINING PROGRAM

## 2023-10-10 PROCEDURE — 80053 COMPREHEN METABOLIC PANEL: CPT | Performed by: STUDENT IN AN ORGANIZED HEALTH CARE EDUCATION/TRAINING PROGRAM

## 2023-10-10 PROCEDURE — G0378 HOSPITAL OBSERVATION PER HR: HCPCS

## 2023-10-10 PROCEDURE — 83735 ASSAY OF MAGNESIUM: CPT | Performed by: STUDENT IN AN ORGANIZED HEALTH CARE EDUCATION/TRAINING PROGRAM

## 2023-10-10 PROCEDURE — 25810000003 SODIUM CHLORIDE 0.9 % SOLUTION: Performed by: STUDENT IN AN ORGANIZED HEALTH CARE EDUCATION/TRAINING PROGRAM

## 2023-10-10 PROCEDURE — 25010000002 ENOXAPARIN PER 10 MG: Performed by: STUDENT IN AN ORGANIZED HEALTH CARE EDUCATION/TRAINING PROGRAM

## 2023-10-10 PROCEDURE — 96372 THER/PROPH/DIAG INJ SC/IM: CPT

## 2023-10-10 PROCEDURE — 84100 ASSAY OF PHOSPHORUS: CPT | Performed by: STUDENT IN AN ORGANIZED HEALTH CARE EDUCATION/TRAINING PROGRAM

## 2023-10-10 RX ADMIN — DOCUSATE SODIUM 50MG AND SENNOSIDES 8.6MG 2 TABLET: 8.6; 5 TABLET, FILM COATED ORAL at 20:01

## 2023-10-10 RX ADMIN — ENOXAPARIN SODIUM 40 MG: 100 INJECTION SUBCUTANEOUS at 13:55

## 2023-10-10 RX ADMIN — Medication 10 ML: at 09:02

## 2023-10-10 RX ADMIN — SODIUM CHLORIDE 75 ML/HR: 9 INJECTION, SOLUTION INTRAVENOUS at 04:46

## 2023-10-10 RX ADMIN — DIPHENHYDRAMINE HYDROCHLORIDE, ZINC ACETATE 1 APPLICATION: 2; .1 CREAM TOPICAL at 04:46

## 2023-10-10 RX ADMIN — NIRMATRELVIR AND RITONAVIR 3 TABLET: KIT at 20:01

## 2023-10-10 RX ADMIN — DOCUSATE SODIUM 50MG AND SENNOSIDES 8.6MG 2 TABLET: 8.6; 5 TABLET, FILM COATED ORAL at 09:01

## 2023-10-10 RX ADMIN — NIRMATRELVIR AND RITONAVIR 3 TABLET: KIT at 09:04

## 2023-10-10 RX ADMIN — Medication: at 10:28

## 2023-10-10 NOTE — PROGRESS NOTES
[facility]   Hospitalist Progress Note  Date: 10/10/2023  Patient Name: Gertrude Nayak  : 1985  MRN: [id number]  Date of admission: 10/8/2023  Room/Bed:       Subjective   Subjective     Chief Complaint: Shortness of breath, palpitation    Summary:Gertrude Nayak is a 37 y.o. female With past medical history of POTS, PVC s/p ablation, gastroparesis and ICA thrombus who initially presented to outside ER with complaints of shortness of breath and palpitation.  Patient stated that she was feeling myalgia since last Thursday, tested negative in home test for COVID.  But her symptoms persisted and she retested on Friday and was positive for COVID then.  She also developed high fever with documented Tmax of 102 last Saturday.  Patient has chronic shortness of breath but stated that her dyspnea worsened since COVID was diagnosed.  She was very short of breath and was unable to walk which she attributed to her underlying POTS.  Patient also complained of cough with phlegm production and lightheadedness.  On the day of presentation, patient was having palpitation along with shortness of breath which woke her up from sleep after which she decided to go to ER of outside hospital.    Patient smokes 2 packs a day and recently moved in with her .  She occasionally drinks alcohol.    On presentation to outside ER, patient was found to be in SVT with HR in 200s.  She received adenosine 6 mg with resolution of SVT and conversion to sinus rhythm.  She was noted to have elevated D-dimer and given her allergy to contrast, she was transferred to [facility] for VQ scan.    On arrival to [facility], patient heart rate was 82 bpm and she was saturating well in room air.  Patient was admitted to hospitalist service for further evaluation and management.    Interval Followup: No acute events overnight.  Patient was sitting comfortably in a recliner not in acute distress.  She stated  she is still lightheaded but better compared to yesterday.  She denied any palpitations she denied any fever, chills, rigors, chest pain, headache, blurry vision or abdominal pain.  Patient saturating well on room air.    There was an episode of NSVT on telemetry last evening around 6 PM.    Review of Systems    All systems reviewed and negative except for what is outlined above.      Objective   Objective     Vitals:   Temp:  [97.2 øF (36.2 øC)-98.1 øF (36.7 øC)] 98.1 øF (36.7 øC)  Heart Rate:  [62-74] 68  Resp:  [15-16] 16  BP: ()/(61-78) 104/78    Physical Exam   General: Awake, alert, NAD  Cardiovascular: RRR, no murmurs   Pulmonary: CTA bilaterally; no wheezes; no conversational dyspnea  Gastrointestinal: S/ND/NT, +BS  Neuro: Alert, awake, oriented x3; speech clear; no tremor  Psych: Mood and affect appropriate      Result Review    Result Review:  I have personally reviewed these results:  [x]  Laboratory      Lab 10/10/23  0620 10/09/23  0407 10/08/23  1135   WBC 5.39 5.13 4.24   HEMOGLOBIN 12.6 13.5 13.7   HEMATOCRIT 37.9 40.5 40.7   PLATELETS 164 172 177   NEUTROS ABS 2.32 2.05 1.65*   IMMATURE GRANS (ABS) 0.01 0.01 0.01   LYMPHS ABS 2.03 2.00 1.54   MONOS ABS 0.67 0.93* 1.01*   EOS ABS 0.34 0.12 0.01   MCV 95.2 95.3 95.1   PROCALCITONIN  --   --  0.05   LDH  --   --  147   PROTIME  --   --  13.3   APTT  --   --  30.9   D DIMER QUANT  --   --  0.61*         Lab 10/10/23  0620 10/09/23  0407 10/08/23  1135   SODIUM 140 138 142   POTASSIUM 3.7 3.8 3.8   CHLORIDE 108* 105 106   CO2 22.9 24.9 24.2   ANION GAP 9.1 8.1 11.8   BUN 6 9 8   CREATININE 0.53* 0.64 0.60   EGFR 120.8 115.5 117.3   GLUCOSE 94 89 88   CALCIUM 8.6 9.0 8.8   MAGNESIUM 2.0 2.1 2.3   PHOSPHORUS 3.4 3.3  --    TSH  --   --  1.100         Lab 10/10/23  0620 10/09/23  0407 10/08/23  1135   TOTAL PROTEIN 6.2 6.5 6.9   ALBUMIN 3.4* 4.0 4.0   GLOBULIN 2.8 2.5 2.9   ALT (SGPT) 10 13 15   AST (SGOT) 11 15 19   BILIRUBIN 0.3 0.2 0.2   ALK PHOS  56 58 64         Lab 10/08/23  1349 10/08/23  1135   PROBNP  --  97.6   HSTROP T 10* 13*   PROTIME  --  13.3   INR  --  1.00             Lab 10/08/23  1135   FERRITIN 45.41         Brief Urine Lab Results  (Last result in the past 365 days)        Color   Clarity   Blood   Leuk Est   Nitrite   Protein   CREAT   Urine HCG        10/08/23 1541               Negative             [x]  Microbiology   Microbiology Results (last 10 days)       Procedure Component Value - Date/Time    COVID-19, ABBOTT IN-HOUSE,NASAL Swab (NO TRANSPORT MEDIA) 2 HR TAT - , [[id number]]  (Abnormal) Collected: 10/08/23 0309    Lab Status: Final result Updated: 10/08/23 1018    Influenza Antigen, Rapid - , [[id number]] Collected: 10/08/23 0309    Lab Status: Final result Updated: 10/08/23 1018          [x]  Radiology  XR Abdomen KUB    Result Date: 10/9/2023    Nonobstructive bowel gas pattern.     DEMAR BRUMFIELD MD       Electronically Signed and Approved By: DEMAR BRUMFIELD MD on 10/09/2023 at 22:03             NM Lung Scan Perfusion Particulate    Result Date: 10/8/2023    1. Normal perfusion scan     Fredi Bellamy M.D.       Electronically Signed and Approved By: Fredi Bellamy M.D. on 10/08/2023 at 17:44             XR Chest PA & Lateral    Result Date: 10/8/2023    1. No acute cardiopulmonary disease.     Fredi Bellamy M.D.       Electronically Signed and Approved By: Fredi Bellamy M.D. on 10/08/2023 at 12:09             XR Chest 1 View    Result Date: 10/8/2023  Chest with no acute disease. Electronically Signed: Andres Blair MD 2023/10/08 at 5:35 CDT Reading Location ID and State: [id number] / [place] Tel [phone], Service support  [phone], Fax [phone]   []  EKG/Telemetry   []  Cardiology/Vascular   []  Pathology  []  Old records  []  Other:    Assessment & Plan   Assessment / Plan     Assessment:  Supraventricular tachycardia  COVID infection  History of POTS  History of PVCs s/p ablation  Gastroparesis  Active  smoker    Plan:  Patient is currently being managed in hospitalist service.  Patient was found to have SVT s/p adenosine after which she was in sinus rhythm.  Cardiology following the patient, SVT likely secondary to recent COVID infection.  Started on on low-dose metoprolol, patient was not able to tolerate it as she became hypotensive and lightheaded ;given history of intolerance to multiple medication in the past for POTS syndrome.  VQ scan showed normal perfusion scan.  Chest x-ray showed no acute cardiopulmonary disease.  Patient currently saturating well on room air.  Transthoracic echo on 10/09 showed EF of 61-65% with normal left ventricular diastolic function and no obvious wall motion abnormalities.  No further episode of SVT.  Only 1 episode of nonsustained VT last evening.  Cardiology following the patient, agreed on discontinuing metoprolol.  Given patient had no episode of SVT, recommended following up with cardiology as outpatient.  Symptomatic management for COVID.  Also started on Paxlovid.  Blood pressure stable today.  Encouraged oral hydration.  Continue to monitor.  Continue rest of the current management.  Patient requested to be discharged tomorrow morning as she has no ride home today.    Disposition: In next 24 hours, home with self-care.     Discussed with RN.    DVT prophylaxis:  Medical DVT prophylaxis orders are present.    CODE STATUS:   Level Of Support Discussed With: Patient  Code Status (Patient has no pulse and is not breathing): CPR (Attempt to Resuscitate)  Medical Interventions (Patient has pulse or is breathing): Full Support      Electronically signed by Jesus Moreno MD, 10/10/23, 9:59 AM EDT.

## 2023-10-10 NOTE — PLAN OF CARE
Goal Outcome Evaluation:         Patient corticosteroidal cream provided for patient rash, KUB acquired for patient complaints of RLQ abdominal pain, patient continues to remain in NSR through shift, RA w/ RR even and unlabored, alert and oriented x 4, call light in reach, bed in lowest setting, blood pressure continue to remain low, NS infusing at 75 mL/Hr. No acute events over night.       Patient requesting to see Cardiology Doctor assigned to her case this A.M. for questions regarding treatment plan.

## 2023-10-10 NOTE — PLAN OF CARE
Goal Outcome Evaluation:   Pt denies any complaints at this time. To DC home tomorrow morning when her ride gets off of work. Ariadna Hamilton RN

## 2023-10-11 ENCOUNTER — READMISSION MANAGEMENT (OUTPATIENT)
Dept: CALL CENTER | Facility: HOSPITAL | Age: 38
End: 2023-10-11
Payer: MEDICARE

## 2023-10-11 VITALS
DIASTOLIC BLOOD PRESSURE: 59 MMHG | OXYGEN SATURATION: 96 % | HEART RATE: 73 BPM | WEIGHT: 200.18 LBS | TEMPERATURE: 97.5 F | BODY MASS INDEX: 31.42 KG/M2 | RESPIRATION RATE: 18 BRPM | SYSTOLIC BLOOD PRESSURE: 90 MMHG | HEIGHT: 67 IN

## 2023-10-11 LAB
ALBUMIN SERPL-MCNC: 3.8 G/DL (ref 3.5–5.2)
ALBUMIN/GLOB SERPL: 1.5 G/DL
ALP SERPL-CCNC: 55 U/L (ref 39–117)
ALT SERPL W P-5'-P-CCNC: 10 U/L (ref 1–33)
ANION GAP SERPL CALCULATED.3IONS-SCNC: 9.6 MMOL/L (ref 5–15)
AST SERPL-CCNC: 11 U/L (ref 1–32)
BASOPHILS # BLD AUTO: 0.03 10*3/MM3 (ref 0–0.2)
BASOPHILS NFR BLD AUTO: 0.6 % (ref 0–1.5)
BILIRUB SERPL-MCNC: 0.3 MG/DL (ref 0–1.2)
BUN SERPL-MCNC: 9 MG/DL (ref 6–20)
BUN/CREAT SERPL: 16.7 (ref 7–25)
CALCIUM SPEC-SCNC: 8.9 MG/DL (ref 8.6–10.5)
CHLORIDE SERPL-SCNC: 106 MMOL/L (ref 98–107)
CO2 SERPL-SCNC: 24.4 MMOL/L (ref 22–29)
CREAT SERPL-MCNC: 0.54 MG/DL (ref 0.57–1)
DEPRECATED RDW RBC AUTO: 43.1 FL (ref 37–54)
EGFRCR SERPLBLD CKD-EPI 2021: 120.3 ML/MIN/1.73
EOSINOPHIL # BLD AUTO: 0.36 10*3/MM3 (ref 0–0.4)
EOSINOPHIL NFR BLD AUTO: 6.6 % (ref 0.3–6.2)
ERYTHROCYTE [DISTWIDTH] IN BLOOD BY AUTOMATED COUNT: 12.4 % (ref 12.3–15.4)
GLOBULIN UR ELPH-MCNC: 2.6 GM/DL
GLUCOSE SERPL-MCNC: 90 MG/DL (ref 65–99)
HCT VFR BLD AUTO: 38.2 % (ref 34–46.6)
HGB BLD-MCNC: 12.6 G/DL (ref 12–15.9)
IMM GRANULOCYTES # BLD AUTO: 0.01 10*3/MM3 (ref 0–0.05)
IMM GRANULOCYTES NFR BLD AUTO: 0.2 % (ref 0–0.5)
LYMPHOCYTES # BLD AUTO: 2.19 10*3/MM3 (ref 0.7–3.1)
LYMPHOCYTES NFR BLD AUTO: 40.2 % (ref 19.6–45.3)
MCH RBC QN AUTO: 30.9 PG (ref 26.6–33)
MCHC RBC AUTO-ENTMCNC: 33 G/DL (ref 31.5–35.7)
MCV RBC AUTO: 93.6 FL (ref 79–97)
MONOCYTES # BLD AUTO: 0.56 10*3/MM3 (ref 0.1–0.9)
MONOCYTES NFR BLD AUTO: 10.3 % (ref 5–12)
NEUTROPHILS NFR BLD AUTO: 2.3 10*3/MM3 (ref 1.7–7)
NEUTROPHILS NFR BLD AUTO: 42.1 % (ref 42.7–76)
NRBC BLD AUTO-RTO: 0 /100 WBC (ref 0–0.2)
PHOSPHATE SERPL-MCNC: 3.4 MG/DL (ref 2.5–4.5)
PLATELET # BLD AUTO: 182 10*3/MM3 (ref 140–450)
PMV BLD AUTO: 11 FL (ref 6–12)
POTASSIUM SERPL-SCNC: 3.6 MMOL/L (ref 3.5–5.2)
PROT SERPL-MCNC: 6.4 G/DL (ref 6–8.5)
RBC # BLD AUTO: 4.08 10*6/MM3 (ref 3.77–5.28)
SODIUM SERPL-SCNC: 140 MMOL/L (ref 136–145)
WBC NRBC COR # BLD: 5.45 10*3/MM3 (ref 3.4–10.8)

## 2023-10-11 PROCEDURE — 80053 COMPREHEN METABOLIC PANEL: CPT | Performed by: STUDENT IN AN ORGANIZED HEALTH CARE EDUCATION/TRAINING PROGRAM

## 2023-10-11 PROCEDURE — 84100 ASSAY OF PHOSPHORUS: CPT | Performed by: STUDENT IN AN ORGANIZED HEALTH CARE EDUCATION/TRAINING PROGRAM

## 2023-10-11 PROCEDURE — 85025 COMPLETE CBC W/AUTO DIFF WBC: CPT | Performed by: STUDENT IN AN ORGANIZED HEALTH CARE EDUCATION/TRAINING PROGRAM

## 2023-10-11 PROCEDURE — G0378 HOSPITAL OBSERVATION PER HR: HCPCS

## 2023-10-11 RX ORDER — NICOTINE 21 MG/24HR
1 PATCH, TRANSDERMAL 24 HOURS TRANSDERMAL EVERY 24 HOURS
Qty: 30 PATCH | Refills: 0 | Status: SHIPPED | OUTPATIENT
Start: 2023-10-11 | End: 2023-11-10

## 2023-10-11 RX ADMIN — Medication 10 ML: at 08:37

## 2023-10-11 RX ADMIN — NIRMATRELVIR AND RITONAVIR 3 TABLET: KIT at 09:29

## 2023-10-11 NOTE — NURSING NOTE
Patient discharging in the morning. Patient alert and oriented x 4. Patient requested midnight vitals skipped for sleep promotion. PCA advised of patient wishes and will continue to monitor patient via vitals.

## 2023-10-11 NOTE — OUTREACH NOTE
Prep Survey      Flowsheet Row Responses   [facility] facility patient discharged from? [facility]   Is LACE score < 7 ? Yes   Eligibility Not Eligible   What are the reasons patient is not eligible? Other  [Low risk for readmission]   Does the patient have one of the following disease processes/diagnoses(primary or secondary)? Other   Prep survey completed? Yes            Jessica LARSEN - Registered Nurse

## 2023-10-11 NOTE — PLAN OF CARE
Goal Outcome Evaluation:            Patient slept through the night shift. Midnight vitals not taken per patient request. Plans for DC to home this A.M. when patient's friend comes to pick her up. No acute events over night. RA w/ RR even and unlabored. No cardiac events per telemetry.

## 2023-10-11 NOTE — DISCHARGE INSTR - APPOINTMENTS
Patient needs to follow up with (Cardiology) on 11/2/23 @10:15am [phone]    Patient to call for PCP- SW left voicemail for [facility]. Patient to call [phone] to arrange an appointment.

## 2023-10-11 NOTE — DISCHARGE SUMMARY
[facility]         HOSPITALIST  DISCHARGE SUMMARY    Patient Name: Gertrude Nayak  : 1985  MRN: [id number]    Date of Admission: 10/8/2023  Date of Discharge: 10/11/2023  Primary Care Physician: No primary care provider on file.    Consults       Date and Time Order Name Status Description    10/8/2023 11:12 AM Inpatient Cardiology Consult              Active and Resolved Hospital Problems:  Supraventricular tachycardia  COVID infection  History of POTS  History of PVCs s/p ablation  Gastroparesis  Active smoker  Active Hospital Problems    Diagnosis POA    **COVID-19 [U07.1] Yes      Resolved Hospital Problems   No resolved problems to display.       Hospital Course     Hospital Course:  Gertrude Nayak is a 37 y.o. female  is a 37 y.o. female With past medical history of POTS, PVC s/p ablation, gastroparesis and ICA thrombus who initially presented to outside ER with complaints of shortness of breath and palpitation.  Patient stated that she was feeling myalgia since last Thursday, tested negative in home test for COVID.  But her symptoms persisted and she retested on Friday and was positive for COVID then.  She also developed high fever with documented Tmax of 102 last Saturday.  Patient has chronic shortness of breath but stated that her dyspnea worsened since COVID was diagnosed.  She was very short of breath and was unable to walk which she attributed to her underlying POTS.  Patient also complained of cough with phlegm production and lightheadedness.  On the day of presentation, patient was having palpitation along with shortness of breath which woke her up from sleep after which she decided to go to ER of outside hospital. Patient smokes 2 packs a day and recently moved in with her .  She occasionally drinks alcohol. On presentation to outside ER, patient was found to be in SVT with HR in 200s.  She received adenosine 6 mg with resolution of SVT and  conversion to sinus rhythm.  She was noted to have elevated D-dimer and given her allergy to contrast, she was transferred to [facility] for VQ scan. On arrival to [facility], patient heart rate was 82 bpm and she was saturating well in room air.  Patient was admitted to hospitalist service for further evaluation and management.  Neurology consulted. Started on on low-dose metoprolol, patient was not able to tolerate it as she became hypotensive and lightheaded ;given history of intolerance to multiple medication in the past for POTS syndrome. VQ scan showed normal perfusion scan.  Patient satting well on room air.  Chest x-ray showed no acute cardiopulmonary disease. Symptomatic management for COVID.  Also started on Paxlovid. Transthoracic echo on 10/09 showed EF of 61-65% with normal left ventricular diastolic function and no obvious wall motion abnormalities.  No further episodes of SVT.  SVT thought elicited by current viral infection.  Patient seen and evaluated on day of discharge and thus stable for discharge home to follow-up with her primary care provider as well as cardiology as an outpatient.        DISCHARGE Follow Up Recommendations for labs and diagnostics: As above      Day of Discharge     Vital Signs:  Temp:  [97.5 øF (36.4 øC)-98.1 øF (36.7 øC)] 97.5 øF (36.4 øC)  Heart Rate:  [62-73] 73  Resp:  [16-18] 18  BP: ()/(59-81) 90/59  Physical Exam:   Gen. well-developed appearing stated age in no acute distress  HEENT: Normocephalic atraumatic moist membranes pupils equal round reactive light, no scleral icterus no conjunctival injection  Cardiovascular: regular rate and rhythm no murmurs rubs or gallops S1-S2, no lower extremity edema appreciated  Pulmonary: Clear to auscultation bilaterally no wheezes rales or rhonchi symmetric chest expansion, unlabored, no conversational dyspnea appreciated  Gastrointestinal: Soft nontender nondistended positive bowel sounds all 4 quadrants no  rebound or guarding  Musculoskeletal: No clubbing cyanosis, warm and well-perfused, calves soft symmetric nontender bilaterally  Skin: Clean dry without rashs  Neuro: Cranial nerves II through XII intact grossly no sensorimotor deficits appreciated bilateral upper and lower extremities  Psych: Patient is calm cooperative and appropriate with exam not responding to internal stimuli  : No Lema catheter no bladder distention no suprapubic tenderness      Discharge Details        Discharge Medications        New Medications        Instructions Start Date   nicotine 21 MG/24HR patch  Commonly known as: NICODERM CQ   1 patch, Transdermal, Every 24 Hours      Nirmatrelvir&Ritonavir 300/100 20 x 150 MG & 10 x 100MG tablet therapy pack tablet  Commonly known as: PAXLOVID   3 tablets, Oral, 2 Times Daily             Continue These Medications        Instructions Start Date   ZyrTEC 10 MG chewable tablet  Generic drug: cetirizine   10 mg, Oral, Daily               Allergies   Allergen Reactions    Nuts Anaphylaxis    Shellfish-Derived Products Anaphylaxis    Venlafaxine Arrhythmia, Confusion, Irritability, Mental Status Change and Shortness Of Breath    Acrylic Polymer [Carbomer]     Adhesive Tape Hives    Bupropion Hallucinations    Contrast Dye (Echo Or Unknown Ct/Mr) Unknown - Low Severity    Corticosteroids Arrhythmia    Gadobutrol Unknown - High Severity    Iodinated Contrast Media Other (See Comments)     Other reaction(s): hives    Metoclopramide Unknown (See Comments)    Shrimp Unknown (See Comments)    Stadol [Butorphanol] Unknown - High Severity    Vancomycin Unknown (See Comments)     Severe as stated by patient       Discharge Disposition:  Home or Self Care    Diet:  Hospital:  Diet Order   Procedures    Diet: Regular/House Diet; Texture: Regular Texture (IDDSI 7); Fluid Consistency: Thin (IDDSI 0)       Discharge Activity:   Activity Instructions       Gradually Increase Activity Until at Pre-Hospitalization  Level              CODE STATUS:  Code Status and Medical Interventions:   Ordered at: 10/08/23 1139     Level Of Support Discussed With:    Patient     Code Status (Patient has no pulse and is not breathing):    CPR (Attempt to Resuscitate)     Medical Interventions (Patient has pulse or is breathing):    Full Support         No future appointments.    Additional Instructions for the Follow-ups that You Need to Schedule       Discharge Follow-up with PCP   As directed       Currently Documented PCP:    No primary care provider on file.    PCP Phone Number:    None     Follow Up Details: Hospital discharge follow-up COVID-19 infection, SVT.        Discharge Follow-up with Specialty: Cardiology Dr. Baron's office; 3 Weeks   As directed      Specialty: Cardiology Dr. Baron's office   Follow Up: 3 Weeks   Follow Up Details: Hospital discharge follow up SVT, POTS                Pertinent  and/or Most Recent Results     PROCEDURES:   None    LAB RESULTS:      Lab 10/11/23  0405 10/10/23  0620 10/09/23  0407 10/08/23  1135   WBC 5.45 5.39 5.13 4.24   HEMOGLOBIN 12.6 12.6 13.5 13.7   HEMATOCRIT 38.2 37.9 40.5 40.7   PLATELETS 182 164 172 177   NEUTROS ABS 2.30 2.32 2.05 1.65*   IMMATURE GRANS (ABS) 0.01 0.01 0.01 0.01   LYMPHS ABS 2.19 2.03 2.00 1.54   MONOS ABS 0.56 0.67 0.93* 1.01*   EOS ABS 0.36 0.34 0.12 0.01   MCV 93.6 95.2 95.3 95.1   PROCALCITONIN  --   --   --  0.05   LDH  --   --   --  147   PROTIME  --   --   --  13.3   APTT  --   --   --  30.9   D DIMER QUANT  --   --   --  0.61*         Lab 10/11/23  0405 10/10/23  0620 10/09/23  0407 10/08/23  1135   SODIUM 140 140 138 142   POTASSIUM 3.6 3.7 3.8 3.8   CHLORIDE 106 108* 105 106   CO2 24.4 22.9 24.9 24.2   ANION GAP 9.6 9.1 8.1 11.8   BUN 9 6 9 8   CREATININE 0.54* 0.53* 0.64 0.60   EGFR 120.3 120.8 115.5 117.3   GLUCOSE 90 94 89 88   CALCIUM 8.9 8.6 9.0 8.8   MAGNESIUM  --  2.0 2.1 2.3   PHOSPHORUS 3.4 3.4 3.3  --    TSH  --   --   --  1.100         Lab  10/11/23  0405 10/10/23  0620 10/09/23  0407 10/08/23  1135   TOTAL PROTEIN 6.4 6.2 6.5 6.9   ALBUMIN 3.8 3.4* 4.0 4.0   GLOBULIN 2.6 2.8 2.5 2.9   ALT (SGPT) 10 10 13 15   AST (SGOT) 11 11 15 19   BILIRUBIN 0.3 0.3 0.2 0.2   ALK PHOS 55 56 58 64         Lab 10/08/23  1349 10/08/23  1135   PROBNP  --  97.6   HSTROP T 10* 13*   PROTIME  --  13.3   INR  --  1.00             Lab 10/08/23  1135   FERRITIN 45.41         Brief Urine Lab Results  (Last result in the past 365 days)        Color   Clarity   Blood   Leuk Est   Nitrite   Protein   CREAT   Urine HCG        10/08/23 1541               Negative             Microbiology Results (last 10 days)       Procedure Component Value - Date/Time    COVID-19, ABBOTT IN-HOUSE,NASAL Swab (NO TRANSPORT MEDIA) 2 HR TAT - , [[id number]]  (Abnormal) Collected: 10/08/23 0309    Lab Status: Final result Updated: 10/08/23 1018    Influenza Antigen, Rapid - , [[id number]] Collected: 10/08/23 0309    Lab Status: Final result Updated: 10/08/23 1018            XR Abdomen KUB    Result Date: 10/9/2023  Impression:   Nonobstructive bowel gas pattern.     DEMAR BRUMFIELD MD       Electronically Signed and Approved By: DEMAR BRUMFIELD MD on 10/09/2023 at 22:03             NM Lung Scan Perfusion Particulate    Result Date: 10/8/2023  Impression:   1. Normal perfusion scan     Fredi Bellamy M.D.       Electronically Signed and Approved By: Fredi Bellamy M.D. on 10/08/2023 at 17:44             XR Chest PA & Lateral    Result Date: 10/8/2023  Impression:   1. No acute cardiopulmonary disease.     Fredi Bellamy M.D.       Electronically Signed and Approved By: Fredi Bellamy M.D. on 10/08/2023 at 12:09             XR Chest 1 View    Result Date: 10/8/2023  Impression: Chest with no acute disease. Electronically Signed: Andres Blair MD 2023/10/08 at 5:35 CDT Reading Location ID and State: [id number] / [place] Tel [phone], Service support  [phone], Fax [phone]     Results for orders  placed during the hospital encounter of 10/08/23    Duplex Venous Lower Extremity - Bilateral CV-READ    Interpretation Summary    Normal bilateral lower extremity venous duplex scan.      Results for orders placed during the hospital encounter of 10/08/23    Duplex Venous Lower Extremity - Bilateral CV-READ    Interpretation Summary    Normal bilateral lower extremity venous duplex scan.      Results for orders placed during the hospital encounter of 10/08/23    Adult Transthoracic Echo Complete w/ Color, Spectral and Contrast if necessary per protocol    Interpretation Summary    Left ventricular ejection fraction appears to be 61 - 65%.    Left ventricular diastolic function was normal.    Estimated right ventricular systolic pressure from tricuspid regurgitation is normal (<35 mmHg).    No obvious wall motion abnormalities      Labs Pending at Discharge:        Time spent on Discharge including face to face service: 25 minutes    Electronically signed by Jose Alfredo Le MD, 10/11/23, 8:48 AM EDT.

## 2023-10-12 LAB
QT INTERVAL: 390 MS
QTC INTERVAL: 433 MS